# Patient Record
Sex: MALE | Race: WHITE | NOT HISPANIC OR LATINO | URBAN - METROPOLITAN AREA
[De-identification: names, ages, dates, MRNs, and addresses within clinical notes are randomized per-mention and may not be internally consistent; named-entity substitution may affect disease eponyms.]

---

## 2021-03-20 ENCOUNTER — OUT OF OFFICE VISIT (OUTPATIENT)
Dept: URBAN - METROPOLITAN AREA MEDICAL CENTER 25 | Facility: MEDICAL CENTER | Age: 81
End: 2021-03-20
Payer: MEDICARE

## 2021-03-20 DIAGNOSIS — R10.13 ABDOMINAL DISCOMFORT, EPIGASTRIC: ICD-10-CM

## 2021-03-20 DIAGNOSIS — R11.2 ACUTE NAUSEA WITH NONBILIOUS VOMITING: ICD-10-CM

## 2021-03-20 PROCEDURE — G8427 DOCREV CUR MEDS BY ELIG CLIN: HCPCS | Performed by: INTERNAL MEDICINE

## 2021-03-20 PROCEDURE — 99222 1ST HOSP IP/OBS MODERATE 55: CPT | Performed by: INTERNAL MEDICINE

## 2021-03-22 ENCOUNTER — OUT OF OFFICE VISIT (OUTPATIENT)
Dept: URBAN - METROPOLITAN AREA MEDICAL CENTER 25 | Facility: MEDICAL CENTER | Age: 81
End: 2021-03-22
Payer: MEDICARE

## 2021-03-22 DIAGNOSIS — D61.818 OTHER PANCYTOPENIA: ICD-10-CM

## 2021-03-22 DIAGNOSIS — R93.3 ABN FINDINGS-GI TRACT: ICD-10-CM

## 2021-03-22 DIAGNOSIS — R11.2 ACUTE NAUSEA WITH NONBILIOUS VOMITING: ICD-10-CM

## 2021-03-22 DIAGNOSIS — R10.13 ABDOMINAL DISCOMFORT, EPIGASTRIC: ICD-10-CM

## 2021-03-22 PROCEDURE — 99232 SBSQ HOSP IP/OBS MODERATE 35: CPT | Performed by: INTERNAL MEDICINE

## 2021-03-23 ENCOUNTER — OUT OF OFFICE VISIT (OUTPATIENT)
Dept: URBAN - METROPOLITAN AREA MEDICAL CENTER 25 | Facility: MEDICAL CENTER | Age: 81
End: 2021-03-23
Payer: MEDICARE

## 2021-03-23 DIAGNOSIS — R10.13 ABDOMINAL DISCOMFORT, EPIGASTRIC: ICD-10-CM

## 2021-03-23 DIAGNOSIS — R11.2 ACUTE NAUSEA WITH NONBILIOUS VOMITING: ICD-10-CM

## 2021-03-23 DIAGNOSIS — K31.89 ACQUIRED DEFORMITY OF DUODENUM: ICD-10-CM

## 2021-03-23 DIAGNOSIS — K29.60 ADENOPAPILLOMATOSIS GASTRICA: ICD-10-CM

## 2021-03-23 PROCEDURE — 43239 EGD BIOPSY SINGLE/MULTIPLE: CPT | Performed by: INTERNAL MEDICINE

## 2021-03-24 ENCOUNTER — LAB OUTSIDE AN ENCOUNTER (OUTPATIENT)
Dept: URBAN - METROPOLITAN AREA CLINIC 19 | Facility: CLINIC | Age: 81
End: 2021-03-24

## 2021-03-24 ENCOUNTER — OUT OF OFFICE VISIT (OUTPATIENT)
Dept: URBAN - METROPOLITAN AREA MEDICAL CENTER 25 | Facility: MEDICAL CENTER | Age: 81
End: 2021-03-24
Payer: MEDICARE

## 2021-03-24 DIAGNOSIS — R11.2 ACUTE NAUSEA WITH NONBILIOUS VOMITING: ICD-10-CM

## 2021-03-24 DIAGNOSIS — R93.3 ABN FINDINGS-GI TRACT: ICD-10-CM

## 2021-03-24 DIAGNOSIS — D61.818 OTHER PANCYTOPENIA: ICD-10-CM

## 2021-03-24 DIAGNOSIS — R10.13 ABDOMINAL DISCOMFORT, EPIGASTRIC: ICD-10-CM

## 2021-03-24 PROCEDURE — 99232 SBSQ HOSP IP/OBS MODERATE 35: CPT | Performed by: PHYSICIAN ASSISTANT

## 2021-03-25 ENCOUNTER — TELEPHONE ENCOUNTER (OUTPATIENT)
Dept: URBAN - METROPOLITAN AREA CLINIC 92 | Facility: CLINIC | Age: 81
End: 2021-03-25

## 2021-03-29 ENCOUNTER — TELEPHONE ENCOUNTER (OUTPATIENT)
Dept: URBAN - METROPOLITAN AREA SURGERY CENTER 30 | Facility: SURGERY CENTER | Age: 81
End: 2021-03-29

## 2021-04-05 ENCOUNTER — WEB ENCOUNTER (OUTPATIENT)
Dept: URBAN - METROPOLITAN AREA CLINIC 19 | Facility: CLINIC | Age: 81
End: 2021-04-05

## 2021-04-05 ENCOUNTER — OFFICE VISIT (OUTPATIENT)
Dept: URBAN - METROPOLITAN AREA CLINIC 19 | Facility: CLINIC | Age: 81
End: 2021-04-05
Payer: MEDICARE

## 2021-04-05 VITALS
TEMPERATURE: 98.4 F | HEIGHT: 68 IN | HEART RATE: 71 BPM | WEIGHT: 165.2 LBS | SYSTOLIC BLOOD PRESSURE: 142 MMHG | BODY MASS INDEX: 25.04 KG/M2 | DIASTOLIC BLOOD PRESSURE: 61 MMHG

## 2021-04-05 DIAGNOSIS — R10.13 EPIGASTRIC PAIN: ICD-10-CM

## 2021-04-05 DIAGNOSIS — K59.00 CONSTIPATION, UNSPECIFIED CONSTIPATION TYPE: ICD-10-CM

## 2021-04-05 PROBLEM — 14760008: Status: ACTIVE | Noted: 2021-04-05

## 2021-04-05 PROBLEM — 79922009: Status: ACTIVE | Noted: 2021-04-05

## 2021-04-05 PROCEDURE — 99213 OFFICE O/P EST LOW 20 MIN: CPT | Performed by: NURSE PRACTITIONER

## 2021-04-05 RX ORDER — ONDANSETRON 8 MG/1
1 TABLET ON THE TONGUE AND ALLOW TO DISSOLVE  AS NEEDED TABLET, ORALLY DISINTEGRATING ORAL ONCE A DAY
Status: ACTIVE | COMMUNITY

## 2021-04-05 RX ORDER — DRONABINOL 2.5 MG/1
1 CAPSULE IN THE EVENING OR AT BEDTIME CAPSULE ORAL ONCE A DAY
Status: ACTIVE | COMMUNITY

## 2021-04-05 RX ORDER — OXYCODONE HYDROCHLORIDE AND ACETAMINOPHEN 5; 325 MG/1; MG/1
1 TABLET AS NEEDED TABLET ORAL
Status: ACTIVE | COMMUNITY

## 2021-04-05 RX ORDER — LOSARTAN POTASSIUM AND HYDROCHLOROTHIAZIDE 100; 12.5 MG/1; MG/1
1 TABLET TABLET, FILM COATED ORAL ONCE A DAY
Status: ACTIVE | COMMUNITY

## 2021-04-05 RX ORDER — HYDROCHLOROTHIAZIDE 25 MG/1
1 TABLET IN THE MORNING TABLET ORAL ONCE A DAY
Status: ACTIVE | COMMUNITY

## 2021-04-05 RX ORDER — LOSARTAN POTASSIUM 100 MG/1
1 TABLET TABLET ORAL ONCE A DAY
Status: DISCONTINUED | COMMUNITY

## 2021-04-05 RX ORDER — FOLIC ACID 1 MG/1
1 TABLET TABLET ORAL ONCE A DAY
Status: ACTIVE | COMMUNITY

## 2021-04-05 RX ORDER — AMLODIPINE BESYLATE 10 MG/1
1 TABLET TABLET ORAL ONCE A DAY
Status: ACTIVE | COMMUNITY

## 2021-04-05 NOTE — PHYSICAL EXAM CHEST:
no lesions,  no deformities, chest wall non-tender,  no masses present, breathing is unlabored without accessory muscle use, clear to auscultation bilaterally

## 2021-04-05 NOTE — PHYSICAL EXAM CONSTITUTIONAL:
appears ill, well developed, well nourished, ambulating without difficulty, normal communication ability

## 2021-04-05 NOTE — HPI-ZZZ
Mr. Rojas is a 80 year-old male who presents today following up from [facility] for epigastric pain. 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 He reported that after 3 days of chemo/immunotherapy he began feeling fatigue, nausea, vomiting, and epigastric pain. During his hospital stay, he had a gastric emptying study for intractable nausea/vomitting, which was normal, as well as an EGD with Dr. Mckoy, which showed erythematous mucosa in the antrum with normal esophagus and duodenum. Gastric biopsy was negative for H. pylori. Duodenal biopsies were normal. Patient was started on PPI BID.   Today, he states he is doing well. He took his medications quickly this normal and felt sick afterward, but he has been seeing improvement every day. He reports no abdominal pain or nausea. He has been taking pantoprazole twice a day with his other medications. He reports not eating a lot and had a bowel movement for the first time since his hospital stay last night. He reports typically going once every two days before his hospital stay. He reports taking dulcolax prn at home but not consistently along with fiber. Colonoscopy: last in 2014 with Dr. Fonseca. He was told to return in 10 years. Due in 2024.

## 2021-04-15 ENCOUNTER — TELEPHONE ENCOUNTER (OUTPATIENT)
Dept: URBAN - METROPOLITAN AREA CLINIC 92 | Facility: CLINIC | Age: 81
End: 2021-04-15

## 2021-04-30 ENCOUNTER — DASHBOARD ENCOUNTERS (OUTPATIENT)
Age: 81
End: 2021-04-30

## 2021-05-06 ENCOUNTER — OFFICE VISIT (OUTPATIENT)
Dept: URBAN - METROPOLITAN AREA CLINIC 19 | Facility: CLINIC | Age: 81
End: 2021-05-06

## 2021-05-06 RX ORDER — ONDANSETRON 8 MG/1
1 TABLET ON THE TONGUE AND ALLOW TO DISSOLVE  AS NEEDED TABLET, ORALLY DISINTEGRATING ORAL ONCE A DAY
Status: ACTIVE | COMMUNITY

## 2021-05-06 RX ORDER — FOLIC ACID 1 MG/1
1 TABLET TABLET ORAL ONCE A DAY
Status: ACTIVE | COMMUNITY

## 2021-05-06 RX ORDER — HYDROCHLOROTHIAZIDE 25 MG/1
1 TABLET IN THE MORNING TABLET ORAL ONCE A DAY
Status: ACTIVE | COMMUNITY

## 2021-05-06 RX ORDER — DRONABINOL 2.5 MG/1
1 CAPSULE IN THE EVENING OR AT BEDTIME CAPSULE ORAL ONCE A DAY
Status: ACTIVE | COMMUNITY

## 2021-05-06 RX ORDER — OXYCODONE HYDROCHLORIDE AND ACETAMINOPHEN 5; 325 MG/1; MG/1
1 TABLET AS NEEDED TABLET ORAL
Status: ACTIVE | COMMUNITY

## 2021-05-06 RX ORDER — AMLODIPINE BESYLATE 10 MG/1
1 TABLET TABLET ORAL ONCE A DAY
Status: ACTIVE | COMMUNITY

## 2021-05-06 RX ORDER — LOSARTAN POTASSIUM AND HYDROCHLOROTHIAZIDE 100; 12.5 MG/1; MG/1
1 TABLET TABLET, FILM COATED ORAL ONCE A DAY
Status: ACTIVE | COMMUNITY

## 2021-05-10 ENCOUNTER — OFFICE VISIT (OUTPATIENT)
Dept: URBAN - METROPOLITAN AREA CLINIC 19 | Facility: CLINIC | Age: 81
End: 2021-05-10